# Patient Record
Sex: MALE | Race: WHITE
[De-identification: names, ages, dates, MRNs, and addresses within clinical notes are randomized per-mention and may not be internally consistent; named-entity substitution may affect disease eponyms.]

---

## 2018-09-18 ENCOUNTER — HOSPITAL ENCOUNTER (OUTPATIENT)
Dept: HOSPITAL 75 - RAD | Age: 53
Discharge: HOME | End: 2018-09-18
Attending: OTOLARYNGOLOGY
Payer: COMMERCIAL

## 2018-09-18 VITALS — HEIGHT: 73 IN | BODY MASS INDEX: 27.99 KG/M2 | WEIGHT: 211.19 LBS

## 2018-09-18 VITALS — SYSTOLIC BLOOD PRESSURE: 116 MMHG | DIASTOLIC BLOOD PRESSURE: 77 MMHG

## 2018-09-18 DIAGNOSIS — Z01.818: Primary | ICD-10-CM

## 2018-09-18 DIAGNOSIS — M50.30: ICD-10-CM

## 2018-09-18 DIAGNOSIS — J35.8: ICD-10-CM

## 2018-09-18 LAB
BASOPHILS # BLD AUTO: 0 10^3/UL (ref 0–0.1)
BASOPHILS NFR BLD AUTO: 0 % (ref 0–10)
BUN/CREAT SERPL: 22
CALCIUM SERPL-MCNC: 9.4 MG/DL (ref 8.5–10.1)
CHLORIDE SERPL-SCNC: 105 MMOL/L (ref 98–107)
CO2 SERPL-SCNC: 25 MMOL/L (ref 21–32)
CREAT SERPL-MCNC: 0.85 MG/DL (ref 0.6–1.3)
EOSINOPHIL # BLD AUTO: 0 10^3/UL (ref 0–0.3)
EOSINOPHIL NFR BLD AUTO: 0 % (ref 0–10)
ERYTHROCYTE [DISTWIDTH] IN BLOOD BY AUTOMATED COUNT: 13.3 % (ref 10–14.5)
GFR SERPLBLD BASED ON 1.73 SQ M-ARVRAT: > 60 ML/MIN
GLUCOSE SERPL-MCNC: 101 MG/DL (ref 70–105)
HCT VFR BLD CALC: 41 % (ref 40–54)
HGB BLD-MCNC: 14.6 G/DL (ref 13.3–17.7)
LYMPHOCYTES # BLD AUTO: 1.9 X 10^3 (ref 1–4)
LYMPHOCYTES NFR BLD AUTO: 26 % (ref 12–44)
MANUAL DIFFERENTIAL PERFORMED BLD QL: NO
MCH RBC QN AUTO: 29 PG (ref 25–34)
MCHC RBC AUTO-ENTMCNC: 36 G/DL (ref 32–36)
MCV RBC AUTO: 82 FL (ref 80–99)
MONOCYTES # BLD AUTO: 0.7 X 10^3 (ref 0–1)
MONOCYTES NFR BLD AUTO: 10 % (ref 0–12)
NEUTROPHILS # BLD AUTO: 4.6 X 10^3 (ref 1.8–7.8)
NEUTROPHILS NFR BLD AUTO: 64 % (ref 42–75)
PLATELET # BLD: 197 10^3/UL (ref 130–400)
PMV BLD AUTO: 9.6 FL (ref 7.4–10.4)
POTASSIUM SERPL-SCNC: 3.1 MMOL/L (ref 3.6–5)
RBC # BLD AUTO: 4.96 10^6/UL (ref 4.35–5.85)
SODIUM SERPL-SCNC: 140 MMOL/L (ref 135–145)
WBC # BLD AUTO: 7.2 10^3/UL (ref 4.3–11)

## 2018-09-18 PROCEDURE — 85025 COMPLETE CBC W/AUTO DIFF WBC: CPT

## 2018-09-18 PROCEDURE — 80048 BASIC METABOLIC PNL TOTAL CA: CPT

## 2018-09-18 PROCEDURE — 93005 ELECTROCARDIOGRAM TRACING: CPT

## 2018-09-18 PROCEDURE — 36415 COLL VENOUS BLD VENIPUNCTURE: CPT

## 2018-09-18 PROCEDURE — 87081 CULTURE SCREEN ONLY: CPT

## 2018-09-18 PROCEDURE — 70491 CT SOFT TISSUE NECK W/DYE: CPT

## 2018-09-18 PROCEDURE — 71046 X-RAY EXAM CHEST 2 VIEWS: CPT

## 2018-09-18 NOTE — DIAGNOSTIC IMAGING REPORT
EXAMINATION: PA and lateral chest at 1117h.



INDICATION: Preop biopsy right tonsil.



There are no prior studies available for comparison. 



Heart size is within normal limits. The lungs are clear. There is

no evidence for failure, pneumonia or for a pleural effusion. The

mediastinum is not widened. The osseous structures are intact.

There is deformity of the midshaft of left clavicle due to a

long-standing fracture. 



IMPRESSION: There is no evidence for active disease. 



Dictated by: 



  Dictated on workstation # [id number]

## 2018-09-18 NOTE — DIAGNOSTIC IMAGING REPORT
PROCEDURE: CT neck soft tissue with contrast.



TECHNIQUE: Multiple contiguous axial images were obtained through

the neck after the administration of contrast.



INDICATION: Asymmetric tonsil.



COMPARISON: There are no prior studies available for comparison.



FINDINGS: The right tonsil does seem larger than the left. The

right tonsil is estimated to be approximately 2.3 x 2.8 cm while

the left tonsil is only 1.8 x 2.0 cm in size. The reason for the

asymmetry of the tonsils is not certain. There is no sign of a

peritonsillar abscess, and there is no adenopathy about the right

tonsil to suggest that it is related to an

inflammatory/infectious process. If further evaluation is

desired, then direct visualization would be recommended.



The submandibular gland and parotid glands are symmetrical and

within normal limits. The thyroid gland is not enlarged and

appears homogeneous.



The tracheal air shadow is not compressed or deviated.



The intracranial contents, the orbits, and the sinuses where

visualized are unremarkable for an acute abnormality.



The bone windows show no evidence for a fracture or for a

destructive lesion. There is reversal of the normal lordosis of

the cervical spine. This may be secondary to muscle spasm and/or

positioning. Also, there is a rounded osteophyte indenting the

right ventral aspect of the thecal sac at the C4-5 level. There

does seem to be moderate central stenosis at this level.



The lung apices are clear.



IMPRESSION:

1. There is asymmetry of the tonsils with the right being larger

than the left. The reason for this discrepancy is not certain,

however. There is no sign of a peritonsillar abscess. Even so,

direct visualization would be recommended for further evaluation.

2. There is no acute abnormality of the neck.

3. There is degenerative disc and bony disease involving the

cervical spine, particularly at L4-5. There does appear to be

spinal stenosis on the right at this level.



Dictated by: 



  Dictated on workstation # [id number]

## 2018-09-20 ENCOUNTER — HOSPITAL ENCOUNTER (OUTPATIENT)
Dept: HOSPITAL 75 - SDC | Age: 53
Discharge: HOME | End: 2018-09-20
Attending: OTOLARYNGOLOGY
Payer: COMMERCIAL

## 2018-09-20 VITALS — DIASTOLIC BLOOD PRESSURE: 68 MMHG | SYSTOLIC BLOOD PRESSURE: 106 MMHG

## 2018-09-20 VITALS — DIASTOLIC BLOOD PRESSURE: 76 MMHG | SYSTOLIC BLOOD PRESSURE: 118 MMHG

## 2018-09-20 VITALS — SYSTOLIC BLOOD PRESSURE: 124 MMHG | DIASTOLIC BLOOD PRESSURE: 77 MMHG

## 2018-09-20 VITALS — BODY MASS INDEX: 27.99 KG/M2 | HEIGHT: 73 IN | WEIGHT: 211.19 LBS

## 2018-09-20 VITALS — DIASTOLIC BLOOD PRESSURE: 92 MMHG | SYSTOLIC BLOOD PRESSURE: 140 MMHG

## 2018-09-20 DIAGNOSIS — G47.33: ICD-10-CM

## 2018-09-20 DIAGNOSIS — Z79.899: ICD-10-CM

## 2018-09-20 DIAGNOSIS — I10: ICD-10-CM

## 2018-09-20 DIAGNOSIS — Z87.891: ICD-10-CM

## 2018-09-20 DIAGNOSIS — C09.1: Primary | ICD-10-CM

## 2018-09-20 PROCEDURE — 88342 IMHCHEM/IMCYTCHM 1ST ANTB: CPT

## 2018-09-20 PROCEDURE — 88304 TISSUE EXAM BY PATHOLOGIST: CPT

## 2018-09-20 PROCEDURE — 88332 PATH CONSLTJ SURG EA ADD BLK: CPT

## 2018-09-20 PROCEDURE — 88331 PATH CONSLTJ SURG 1 BLK 1SPC: CPT

## 2018-09-20 RX ADMIN — SODIUM CHLORIDE, SODIUM LACTATE, POTASSIUM CHLORIDE, AND CALCIUM CHLORIDE PRN MLS/HR: 600; 310; 30; 20 INJECTION, SOLUTION INTRAVENOUS at 09:52

## 2018-09-20 RX ADMIN — SODIUM CHLORIDE, SODIUM LACTATE, POTASSIUM CHLORIDE, AND CALCIUM CHLORIDE PRN MLS/HR: 600; 310; 30; 20 INJECTION, SOLUTION INTRAVENOUS at 07:10

## 2018-09-20 NOTE — PROGRESS NOTE-POST OPERATIVE
Post-Operative Progess Note


Surgeon (s)/Assistant (s)


Surgeon


SOSA PAREDES MD


Assistant


n/a





Pre-Operative Diagnosis


Abnormal Right Tonsil/Right Throat Pain





Post-Operative Diagnosis


same





Post-Op Procedure Note


Date of Procedure:  Sep 20, 2018


Name of Procedure Performed:  


Right Tonsillectomy


Description & Findings


Description and Findings:





n/a


Anesthesia Type


get


Estimated Blood Loss


minimal


Packing


none.


Specimen(s) collected/removed


right tonsil for frozen section











SOSA PAREDES MD Sep 20, 2018 9:03 am

## 2018-09-20 NOTE — PROGRESS NOTE-PRE OPERATIVE
Pre-Operative Progress Note


H&P Reviewed


The H&P was reviewed, patient examined and no changes noted.


Date Seen by Provider:  Sep 20, 2018


Time Seen by Provider:  08:00


Date H&P Reviewed:  Sep 20, 2018


Time H&P Reviewed:  08:00


Pre-Operative Diagnosis:  Abnormal Right Tonsil/Right Throat Pain











SOSA PAREDES MD Sep 20, 2018 8:02 am

## 2018-09-20 NOTE — ANESTHESIA-GENERAL POST-OP
General


Patient Condition


Mental Status/LOC:  Same as Preop


Cardiovascular:  Satisfactory


Nausea/Vomiting:  Absent


Respiratory:  Satisfactory


Pain:  Controlled


Complications:  Absent





Post Op Complications


Complications


None





Follow Up Care/Instructions


Patient Instructions


None needed.





Anesthesia/Patient Condition


Patient Condition


Patient is doing well, no complaints, stable vital signs, no apparent adverse 

anesthesia problems.   


No complications reported per nursing.











CHELSEA HEADLEY CRNA Sep 20, 2018 14:12

## 2018-10-30 LAB
BUN/CREAT SERPL: 14
CREAT SERPL-MCNC: 1.23 MG/DL (ref 0.6–1.3)
GFR SERPLBLD BASED ON 1.73 SQ M-ARVRAT: > 60 ML/MIN

## 2019-01-02 ENCOUNTER — HOSPITAL ENCOUNTER (OUTPATIENT)
Dept: HOSPITAL 75 - ONC | Age: 54
LOS: 12 days | Discharge: HOME | End: 2019-01-14
Attending: RADIOLOGY
Payer: COMMERCIAL

## 2019-01-02 DIAGNOSIS — Z79.899: ICD-10-CM

## 2019-01-02 DIAGNOSIS — Z87.891: ICD-10-CM

## 2019-01-02 DIAGNOSIS — C09.1: Primary | ICD-10-CM

## 2019-01-02 DIAGNOSIS — I10: ICD-10-CM

## 2019-01-02 PROCEDURE — 77336 RADIATION PHYSICS CONSULT: CPT

## 2019-01-02 PROCEDURE — 77386: CPT

## 2019-01-02 PROCEDURE — 77338 DESIGN MLC DEVICE FOR IMRT: CPT

## 2019-01-02 PROCEDURE — 77334 RADIATION TREATMENT AID(S): CPT

## 2019-01-02 PROCEDURE — 77300 RADIATION THERAPY DOSE PLAN: CPT

## 2019-01-02 PROCEDURE — 84520 ASSAY OF UREA NITROGEN: CPT

## 2019-01-02 PROCEDURE — 82565 ASSAY OF CREATININE: CPT

## 2019-01-02 PROCEDURE — 77301 RADIOTHERAPY DOSE PLAN IMRT: CPT

## 2019-01-02 PROCEDURE — 99204 OFFICE O/P NEW MOD 45 MIN: CPT

## 2019-01-22 ENCOUNTER — HOSPITAL ENCOUNTER (OUTPATIENT)
Dept: HOSPITAL 75 - ONC | Age: 54
LOS: 90 days | Discharge: HOME | End: 2019-04-22
Attending: RADIOLOGY
Payer: COMMERCIAL

## 2019-01-22 DIAGNOSIS — Z79.899: ICD-10-CM

## 2019-01-22 DIAGNOSIS — C09.1: Primary | ICD-10-CM

## 2019-01-22 DIAGNOSIS — I10: ICD-10-CM

## 2019-01-22 DIAGNOSIS — Z87.891: ICD-10-CM

## 2019-01-22 PROCEDURE — 99213 OFFICE O/P EST LOW 20 MIN: CPT

## 2019-03-25 ENCOUNTER — HOSPITAL ENCOUNTER (OUTPATIENT)
Dept: HOSPITAL 75 - RAD | Age: 54
End: 2019-03-25
Attending: OTOLARYNGOLOGY
Payer: COMMERCIAL

## 2019-03-25 DIAGNOSIS — Z85.818: ICD-10-CM

## 2019-03-25 DIAGNOSIS — R07.0: ICD-10-CM

## 2019-03-25 DIAGNOSIS — K76.89: Primary | ICD-10-CM

## 2019-03-25 LAB
BUN/CREAT SERPL: 14
CREAT SERPL-MCNC: 0.8 MG/DL (ref 0.6–1.3)
GFR SERPLBLD BASED ON 1.73 SQ M-ARVRAT: > 60 ML/MIN

## 2019-03-25 PROCEDURE — 36415 COLL VENOUS BLD VENIPUNCTURE: CPT

## 2019-03-25 PROCEDURE — 82565 ASSAY OF CREATININE: CPT

## 2019-03-25 PROCEDURE — 70491 CT SOFT TISSUE NECK W/DYE: CPT

## 2019-03-25 PROCEDURE — 74177 CT ABD & PELVIS W/CONTRAST: CPT

## 2019-03-25 PROCEDURE — 84520 ASSAY OF UREA NITROGEN: CPT

## 2019-03-25 NOTE — DIAGNOSTIC IMAGING REPORT
PROCEDURE: CT neck soft tissue with contrast.



TECHNIQUE: Multiple contiguous axial images were obtained through

the neck after the administration of contrast. Auto Exposure

Controls were utilized during the CT exam to meet ALARA standards

for radiation dose reduction. 



INDICATION:  History of right tonsil carcinoma. Patient complains

of left tonsillar region pain.



COMPARISON: Correlation is made with prior CT neck from

09/18/2018.



FINDINGS: The visualized intracranial structures are

unremarkable. Posterior nasopharynx is unremarkable. Previously

noted soft tissue prominence in the tonsillar region, right

greater is no longer appreciated. The parapharyngeal fat planes

are preserved. Oral pharyngeal mucosa appears unremarkable.

Epiglottis and larynx are unremarkable. No thyroid mass is

detected. The submandibular glands and parotid glands appear to

be symmetric bilaterally. No cervical lymphadenopathy is seen. No

fluid collections are identified.



IMPRESSION: Essentially unremarkable CT soft tissue neck study.



Dictated by: 



  Dictated on workstation # [id number]

## 2019-09-03 ENCOUNTER — HOSPITAL ENCOUNTER (OUTPATIENT)
Dept: HOSPITAL 75 - RAD | Age: 54
End: 2019-09-03
Attending: OTOLARYNGOLOGY
Payer: COMMERCIAL

## 2019-09-03 DIAGNOSIS — K76.89: Primary | ICD-10-CM

## 2019-09-03 DIAGNOSIS — Z85.818: ICD-10-CM

## 2019-09-03 DIAGNOSIS — K43.9: ICD-10-CM

## 2019-09-03 DIAGNOSIS — K80.20: ICD-10-CM

## 2019-09-03 LAB
BUN/CREAT SERPL: 15
CREAT SERPL-MCNC: 0.95 MG/DL (ref 0.6–1.3)
GFR SERPLBLD BASED ON 1.73 SQ M-ARVRAT: > 60 ML/MIN

## 2019-09-03 PROCEDURE — 70491 CT SOFT TISSUE NECK W/DYE: CPT

## 2019-09-03 PROCEDURE — 84520 ASSAY OF UREA NITROGEN: CPT

## 2019-09-03 PROCEDURE — 82565 ASSAY OF CREATININE: CPT

## 2019-09-03 PROCEDURE — 74177 CT ABD & PELVIS W/CONTRAST: CPT

## 2019-09-03 PROCEDURE — 36415 COLL VENOUS BLD VENIPUNCTURE: CPT

## 2019-09-03 NOTE — DIAGNOSTIC IMAGING REPORT
CLINICAL INDICATION: Patient with history of tonsil carcinoma. No

neck complaints currently.



EXAM: CT scan of the neck with 100 cc of Omnipaque 350 IV

contrast. Coronal and sagittal reformatted images are created.



COMPARISON: CT scan of the neck with contrast dated 03/25/2019.



FINDINGS:

There is interval decreased mild soft tissue swelling involving

the supraglottic larynx. There is no significant swelling seen.

There is no measurable mass. The glottis is closed. The

nasopharynx, oropharynx, hypopharynx and laryngeal soft tissue

structures are unremarkable. There is no neck lymphadenopathy.

There is persistent enhancement and lobulated appearance to the

right submandibular gland. There is small amount of adjacent fat

stranding to the right submandibular gland which has decreased

compared to the prior study. There is also decreased fat

stranding superficial and deep to the right platysmas muscle

along the length of the neck. There is residual mild thickening

of the right platysmas muscle. These findings may be from post

treatment changes.



The remainder of the salivary glands are unremarkable. Thyroid

gland is unremarkable. There is no lymphadenopathy.



The neck vascular structures are patent as visualized. The

visualized upper lung fields show mild emphysematous lung

disease. Cervical spine degenerative disease is seen. Limited

visualization of the intracranial structures are unremarkable.

Again seen small amount of mucosal thickening/mucous retention

cysts involving both maxillary sinuses. Temporal bone structures

are unremarkable.



IMPRESSION:

1: There is no evidence of developing mass or lymphadenopathy.

There is interval decreased swelling in the supraglottic larynx

region.



2: There is persistent enhancement in lobulated appearance of the

right submandibular gland. There is small amount of fat stranding

adjacent to the right submandibular gland which has decreased

compared to the prior study. There is also decreased swelling

along the right side of the neck and right platysmas muscle

region. These findings are suspected to be related to post

treatment changes.



3: The remainder of this exam shows no significant interval

change compared to the prior study of comparison.



Dictated by: 



  Dictated on workstation # [id number]

## 2019-09-03 NOTE — DIAGNOSTIC IMAGING REPORT
PROCEDURE: CT abdomen and pelvis with contrast.



TECHNIQUE: Multiple contiguous axial images were obtained through

the abdomen and pelvis after administration of intravenous

contrast. Auto Exposure Controls were utilized during the CT exam

to meet ALARA standards for radiation dose reduction. 



INDICATION: Hepatic cyst. Patient also complains of mid abdominal

soreness.



COMPARISON: Correlation is made with prior CT from 03/25/2019.



FINDINGS: The lung bases demonstrate minimal infiltrate or

atelectasis in the posterior left lower lobe. No discrete liver

mass is identified. Gallbladder appears to contain a small stone.

There is no biliary ductal dilatation. Pancreas and spleen are

unremarkable. No adrenal mass is seen. Kidneys are unremarkable.

Aorta is nonaneurysmal. No central retroperitoneal or mesenteric

lymphadenopathy is detected. Small and large bowel loops appear

to be normal in caliber. There is a small fat-containing left

paramidline ventral hernia. No herniated bowel loops are seen.

There is no ascites. The bladder and prostate are unremarkable.

There is a small fat-containing left inguinal hernia. Bony

structures are nonacute.



IMPRESSION:

1. No discrete liver mass is identified.

2. Cholelithiasis.

3. Small fat-containing left paramidline ventral hernia. No acute

feature in the abdomen or pelvis is identified.



Dictated by: 



  Dictated on workstation # [id number]

## 2022-10-26 ENCOUNTER — HOSPITAL ENCOUNTER (OUTPATIENT)
Dept: HOSPITAL 75 - WOUNDCARE | Age: 57
End: 2022-10-26
Attending: FAMILY MEDICINE
Payer: COMMERCIAL

## 2022-10-26 DIAGNOSIS — Z87.891: ICD-10-CM

## 2022-10-26 DIAGNOSIS — K02.9: ICD-10-CM

## 2022-10-26 DIAGNOSIS — K04.1: Primary | ICD-10-CM

## 2022-10-26 DIAGNOSIS — C44.329: ICD-10-CM
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
## 2022-10-26 PROCEDURE — 99213 OFFICE O/P EST LOW 20 MIN: CPT
